# Patient Record
Sex: MALE | Race: WHITE | NOT HISPANIC OR LATINO | URBAN - METROPOLITAN AREA
[De-identification: names, ages, dates, MRNs, and addresses within clinical notes are randomized per-mention and may not be internally consistent; named-entity substitution may affect disease eponyms.]

---

## 2020-08-20 ENCOUNTER — EMERGENCY (EMERGENCY)
Facility: HOSPITAL | Age: 45
LOS: 1 days | Discharge: HOME | End: 2020-08-20
Attending: EMERGENCY MEDICINE
Payer: SELF-PAY

## 2020-08-20 VITALS
TEMPERATURE: 98 F | OXYGEN SATURATION: 96 % | HEART RATE: 105 BPM | WEIGHT: 195.11 LBS | DIASTOLIC BLOOD PRESSURE: 100 MMHG | SYSTOLIC BLOOD PRESSURE: 164 MMHG | RESPIRATION RATE: 24 BRPM | HEIGHT: 71 IN

## 2020-08-20 VITALS
OXYGEN SATURATION: 98 % | RESPIRATION RATE: 18 BRPM | HEART RATE: 83 BPM | SYSTOLIC BLOOD PRESSURE: 126 MMHG | TEMPERATURE: 99 F | DIASTOLIC BLOOD PRESSURE: 72 MMHG

## 2020-08-20 PROCEDURE — 99283 EMERGENCY DEPT VISIT LOW MDM: CPT | Mod: 25

## 2020-08-20 PROCEDURE — 90471 IMMUNIZATION ADMIN: CPT

## 2020-08-20 PROCEDURE — 12032 INTMD RPR S/A/T/EXT 2.6-7.5: CPT

## 2020-08-20 PROCEDURE — 90715 TDAP VACCINE 7 YRS/> IM: CPT

## 2020-08-20 PROCEDURE — 99053 MED SERV 10PM-8AM 24 HR FAC: CPT

## 2020-08-20 RX ORDER — TETANUS TOXOID, REDUCED DIPHTHERIA TOXOID AND ACELLULAR PERTUSSIS VACCINE, ADSORBED 5; 2.5; 8; 8; 2.5 [IU]/.5ML; [IU]/.5ML; UG/.5ML; UG/.5ML; UG/.5ML
0.5 SUSPENSION INTRAMUSCULAR ONCE
Refills: 0 | Status: COMPLETED | OUTPATIENT
Start: 2020-08-20 | End: 2020-08-20

## 2020-08-20 RX ORDER — CEPHALEXIN 500 MG
1 CAPSULE ORAL
Qty: 28 | Refills: 0
Start: 2020-08-20 | End: 2020-08-26

## 2020-08-20 RX ORDER — CEPHALEXIN 500 MG
500 CAPSULE ORAL ONCE
Refills: 0 | Status: DISCONTINUED | OUTPATIENT
Start: 2020-08-20 | End: 2020-08-25

## 2020-08-20 RX ADMIN — TETANUS TOXOID, REDUCED DIPHTHERIA TOXOID AND ACELLULAR PERTUSSIS VACCINE, ADSORBED 0.5 MILLILITER(S): 5; 2.5; 8; 8; 2.5 SUSPENSION INTRAMUSCULAR at 02:06

## 2020-08-20 NOTE — ED PROVIDER NOTE - PATIENT PORTAL LINK FT
You can access the FollowMyHealth Patient Portal offered by [facility] by registering at the following website: [email or web address]. By joining [employer]’s FollowMyHealth portal, you will also be able to view your health information using other applications (apps) compatible with our system.

## 2020-08-20 NOTE — ED PROVIDER NOTE - OBJECTIVE STATEMENT
43 yo male no PMhx presents to ED c/o left arm laceration x1 hour PTA. Patient fell forward, arm through window. Patient right hand dominant. Tetanus unknown. 45 yo male no PMhx presents to ED c/o left arm laceration x1 hour PTA. Patient fell forward, arm through window. Patient right hand dominant. Tetanus unknown. No further complaints at this time.

## 2020-08-20 NOTE — ED ADULT TRIAGE NOTE - CHIEF COMPLAINT QUOTE
deep laceration to left forearm - " my arm fell through a broken glass window"   + pulse, + active bleed, pressure dressing/elevation applied, unk tetanus

## 2020-08-20 NOTE — ED PROVIDER NOTE - NSFOLLOWUPINSTRUCTIONS_ED_ALL_ED_FT
- Prescription sent to pharmacy.  - Ibuprofen/acetaminophen for pain.  - Keep dressing clean, dry and in place for 24 hours. Then, may remove and cleanse using soap and water.  - Apply Bacitracin as needed.  - Suture removal 10 days. May present to primary care doctor, urgent care, or ED.  - Keep out of sun.  - Please follow up with your primary care physician in 24-48 hours.  - Please seek immediate medical attention for any new/worsening, signs/symptoms or concerns including but not limited to severe pain/swelling/surrounding redness, or drainage from wound.    Feel better!    Laceration    A laceration is a cut that goes through all of the layers of the skin and into the tissue that is right under the skin. Some lacerations heal on their own. Others need to be closed with skin adhesive strips, skin glue, stitches (sutures), or staples. Proper laceration care minimizes the risk of infection and helps the laceration to heal better.  If non-absorbable stitches or staples have been placed, they must be taken out within the time frame instructed by your healthcare provider.    SEEK IMMEDIATE MEDICAL CARE IF YOU HAVE ANY OF THE FOLLOWING SYMPTOMS: swelling around the wound, worsening pain, drainage from the wound, red streaking going away from your wound, inability to move finger or toe near the laceration, or discoloration of skin near the laceration.

## 2020-08-20 NOTE — ED PROVIDER NOTE - ATTENDING CONTRIBUTION TO CARE
I, Micah Zendejas, have personally performed a face to face diagnostic evaluation on this patient. I have reviewed the ACP note and agree with the history, exam and plan of care, except as noted.    43 yo M  sustained 5 cm laceration over  left forearm when he fell through window. no other injury. Full ROM, sensation intact, <2 sec capillary refill, no overlying erythema. Bleeding controlled. Tdap given. laceration repaired.

## 2020-08-20 NOTE — ED PROVIDER NOTE - PHYSICAL EXAMINATION
General: A&Ox3. In NAD, non-toxic appearing; well nourished/developed.  Cardio: No lifts, heaves, visible pulsations. No thrills. Rate and rhythm regular, S1 & S2 clear. No audible murmur, gallop, or rub.   PV: Pulses: b/l 2+ radial. Capillary refill <2 seconds.  Resp: Normal AP to lateral diameter, symmetrical excursion b/l. Breath sounds vesicular, symmetrical and without rales, rhonchi or wheezing b/l.  Skin/MSK/Neuro: Warm, dry, color normal for race. Approx. 5cm deep, linear laceration to left lateral forearm, with subcutaneous fat and muscle exposure, actively bleeding. No tendon involvement. No motor/sensory deficits.